# Patient Record
Sex: MALE | Race: OTHER | URBAN - METROPOLITAN AREA
[De-identification: names, ages, dates, MRNs, and addresses within clinical notes are randomized per-mention and may not be internally consistent; named-entity substitution may affect disease eponyms.]

---

## 2018-09-12 ENCOUNTER — EMERGENCY (EMERGENCY)
Facility: HOSPITAL | Age: 33
LOS: 1 days | Discharge: HOME | End: 2018-09-12
Attending: EMERGENCY MEDICINE
Payer: SELF-PAY

## 2018-09-12 VITALS
SYSTOLIC BLOOD PRESSURE: 134 MMHG | OXYGEN SATURATION: 99 % | DIASTOLIC BLOOD PRESSURE: 86 MMHG | TEMPERATURE: 98 F | HEART RATE: 70 BPM | RESPIRATION RATE: 20 BRPM

## 2018-09-12 VITALS — WEIGHT: 182.1 LBS | HEIGHT: 66 IN

## 2018-09-12 PROCEDURE — T1013: CPT

## 2018-09-12 PROCEDURE — 99283 EMERGENCY DEPT VISIT LOW MDM: CPT

## 2018-09-12 RX ORDER — CEPHALEXIN 500 MG
1 CAPSULE ORAL
Qty: 28 | Refills: 0 | OUTPATIENT
Start: 2018-09-12 | End: 2018-09-18

## 2018-09-12 RX ORDER — CEPHALEXIN 500 MG
500 CAPSULE ORAL ONCE
Qty: 0 | Refills: 0 | Status: COMPLETED | OUTPATIENT
Start: 2018-09-12 | End: 2018-09-12

## 2018-09-12 RX ADMIN — Medication 100 MILLIGRAM(S): at 21:18

## 2018-09-12 RX ADMIN — Medication 500 MILLIGRAM(S): at 21:19

## 2018-09-12 NOTE — ED STATDOCS - PHYSICAL EXAMINATION
Const: Awake, alert and oriented. In no acute distress. Well appearing.  HEENT: NC/AT. Moist mucous membranes.  Eyes: Conjunctiva pink, sclera white bilaterally, EOMI.  Neck:. Soft and supple. Full ROM without pain.  Cardiac. +S1/S2. No murmurs.   Resp: Speaking in full sentences. No evidence of respiratory distress. No wheezes, rales or rhonchi.  Abd: Soft, non-tender, non-distended. Normal bowel sounds in all 4 quadrants. No guarding or rebound.  Back: Spine midline and non-tender. No CVAT.  Skin: Area of slight erythema, no flutuence or induration noted to left lower groin area, no penile discharge, no external lesions   Lymph: No cervical lymphadenopathy.  Neuro: Awake, alert & oriented x 3. Moves all extremities symmetrically.

## 2018-09-12 NOTE — ED STATDOCS - ATTENDING CONTRIBUTION TO CARE
The patient seen and examined.  The patient has small area of open wound with minimal redness.  No tender, no crepitus, no fluctulant area.    The patient think it was insect bite.    Thigh cellulitis    I, Shaji Andrea, performed the initial face to face bedside interview with this patient regarding history of present illness, review of symptoms and relevant past medical, social and family history.  I completed an independent physical examination.  I was the initial provider who evaluated this patient. I have signed out the follow up of any pending tests (i.e. labs, radiological studies) to the ACP.  I have communicated the patient’s plan of care and disposition with the ACP.

## 2018-09-12 NOTE — ED ADULT TRIAGE NOTE - CHIEF COMPLAINT QUOTE
"There is something on my left groin" "I squeezed it and it looked like a bug came out but its a rash" c/o left groin pain x1 month. Denies fevers, reports pain top his back. Able to ambulate with steady gait noted. In no apparent distress @ this time

## 2018-09-12 NOTE — ED ADULT NURSE NOTE - OBJECTIVE STATEMENT
as per pt has a red area lt side of groin for over a month. was using a cream not helping denies pain

## 2018-09-12 NOTE — ED STATDOCS - OBJECTIVE STATEMENT
Patient is a 34 y/o male c/o of rash to his groin. Patient states he noticed a red spot one month ago. Patient states recently he squeezed the area and blood was released from area. Patient denies fevers. Patient denies any other complaints. Patient denies fevers, dysuria, hematuria, penile discharge, abdominal pain, nausea, vomiting.

## 2018-09-14 RX ORDER — CEPHALEXIN 500 MG
1 CAPSULE ORAL
Qty: 28 | Refills: 0 | OUTPATIENT
Start: 2018-09-14 | End: 2018-09-20

## 2022-11-12 NOTE — ED STATDOCS - DISCHARGE DATE
12-Sep-2018 [Alert] : alert [No Acute Distress] : no acute distress [Normocephalic] : normocephalic [EOMI Bilateral] : EOMI bilateral [Clear tympanic membranes with bony landmarks and light reflex present bilaterally] : clear tympanic membranes with bony landmarks and light reflex present bilaterally  [Nonerythematous Oropharynx] : nonerythematous oropharynx [Pink Nasal Mucosa] : pink nasal mucosa [Supple, full passive range of motion] : supple, full passive range of motion [No Palpable Masses] : no palpable masses [Clear to Auscultation Bilaterally] : clear to auscultation bilaterally [Regular Rate and Rhythm] : regular rate and rhythm [Normal S1, S2 audible] : normal S1, S2 audible [No Murmurs] : no murmurs [+2 Femoral Pulses] : +2 femoral pulses [NonTender] : non tender [Soft] : soft [Non Distended] : non distended [Normoactive Bowel Sounds] : normoactive bowel sounds [No Hepatomegaly] : no hepatomegaly [No Splenomegaly] : no splenomegaly [Benjamin: _____] : Benjamin [unfilled] [No Abnormal Lymph Nodes Palpated] : no abnormal lymph nodes palpated [Normal Muscle Tone] : normal muscle tone [No Gait Asymmetry] : no gait asymmetry [No pain or deformities with palpation of bone, muscles, joints] : no pain or deformities with palpation of bone, muscles, joints [Straight] : straight [+2 Patella DTR] : +2 patella DTR [Cranial Nerves Grossly Intact] : cranial nerves grossly intact [No Rash or Lesions] : no rash or lesions

## 2023-01-20 ENCOUNTER — OFFICE (OUTPATIENT)
Dept: URBAN - METROPOLITAN AREA CLINIC 115 | Facility: CLINIC | Age: 38
Setting detail: OPHTHALMOLOGY
End: 2023-01-20
Payer: COMMERCIAL

## 2023-01-20 DIAGNOSIS — H16.223: ICD-10-CM

## 2023-01-20 DIAGNOSIS — H01.005: ICD-10-CM

## 2023-01-20 DIAGNOSIS — H11.042: ICD-10-CM

## 2023-01-20 DIAGNOSIS — H01.002: ICD-10-CM

## 2023-01-20 PROCEDURE — 92004 COMPRE OPH EXAM NEW PT 1/>: CPT | Performed by: OPHTHALMOLOGY

## 2023-01-20 PROCEDURE — 92250 FUNDUS PHOTOGRAPHY W/I&R: CPT | Performed by: OPHTHALMOLOGY

## 2023-01-20 ASSESSMENT — SPHEQUIV_DERIVED
OS_SPHEQUIV: 0.5
OD_SPHEQUIV: 0

## 2023-01-20 ASSESSMENT — REFRACTION_AUTOREFRACTION
OD_CYLINDER: -0.50
OS_AXIS: 175
OD_AXIS: 001
OD_SPHERE: +0.25
OS_CYLINDER: -0.50
OS_SPHERE: +0.75

## 2023-01-20 ASSESSMENT — TONOMETRY
OS_IOP_MMHG: 15
OD_IOP_MMHG: 13

## 2023-01-20 ASSESSMENT — LID EXAM ASSESSMENTS
OS_BLEPHARITIS: LLL T
OD_BLEPHARITIS: RLL T

## 2023-01-20 ASSESSMENT — CORNEAL PTERYGIUM: OS_PTERYGIUM: NASAL 1MM

## 2023-01-20 ASSESSMENT — VISUAL ACUITY
OS_BCVA: 20/20
OD_BCVA: 20/20

## 2023-01-20 ASSESSMENT — CONFRONTATIONAL VISUAL FIELD TEST (CVF)
OD_FINDINGS: FULL
OS_FINDINGS: FULL

## 2023-01-20 ASSESSMENT — SUPERFICIAL PUNCTATE KERATITIS (SPK)
OD_SPK: T
OS_SPK: T

## 2023-03-30 ENCOUNTER — APPOINTMENT (OUTPATIENT)
Dept: NEUROLOGY | Facility: CLINIC | Age: 38
End: 2023-03-30

## 2025-05-08 NOTE — ED STATDOCS - NS_EDPROVIDERDISPOUSERTYPE_ED_A_ED
Overnight events noted      VITAL:  T(C): , Max: 37 (05-07-25 @ 20:05)  T(F): , Max: 98.6 (05-07-25 @ 20:05)  HR: 110 (05-08-25 @ 05:14)  BP: 114/75 (05-08-25 @ 05:14)  BP(mean): --  RR: 18 (05-08-25 @ 05:14)  SpO2: 98% (05-08-25 @ 05:14)  Wt(kg): --      PHYSICAL EXAM:  Constitutional: NAD, Alert  HEENT: NCAT, MMM  Neck: Supple, No JVD  Respiratory: CTA-b/l  Cardiovascular: tachy reg s1s2  Gastrointestinal: BS+, soft, NT/ND  : (+)maldonado  Extremities: No peripheral edema b/l  Neurological: reduced generalized strength  Back: no CVAT b/l  Skin: No rashes, no nevi    LABS:                        7.3    9.92  )-----------( 182      ( 08 May 2025 07:10 )             23.3     Na(143)/K(3.8)/Cl(107)/HCO3(18)/BUN(94)/Cr(4.84)Glu(102)/Ca(8.7)/Mg(1.9)/PO4(5.1)    05-08 @ 07:05  Na(140)/K(4.0)/Cl(104)/HCO3(21)/BUN(113)/Cr(5.06)Glu(114)/Ca(8.9)/Mg(1.9)/PO4(5.6)    05-07 @ 08:45  Na(142)/K(3.9)/Cl(106)/HCO3(18)/BUN(111)/Cr(4.91)Glu(102)/Ca(8.6)/Mg(1.9)/PO4(5.4)    05-06 @ 06:58        IMPRESSION: 64M w/ HTN, gout, AFib, and CKD4-5, 5/1/25 p/w DEEDEE on CKD  (1)CKD - stage 4-5 - diabetic nephropathy  (2)DEEDEE - obstructive - improving s/p maldonado placement. There was a superimposed prerenal component as of yesterday whereby the numbers stagnated; the numbers are again improving as of today, with effective treatment of the UTI  (3)Metabolic acidosis - renally mediated -acceptable, on standing PO NaHCO3  (4)Hyperphosphatemia -improving, on Renvela with meals  (5)CV - acceptable BP/volume  (6)Anemia - s/p PRBCs/Retacrit; on standing PO FeSO4  (7)ID - UTI - on IV Azactam      RECOMMEND:  (1)NaHCO3, FeSO4, and Renvela as ordered  (2)Continue abx for GFR<15  (3)BMP+Mg+PO4 daily while admitted            Hilton May MD  [facility]  Office/on call physician: [phone]  Cell (7a-7p): [phone]       no pain, no sob  sister Sudha at bedside      VITAL:  T(C): , Max: 37 (05-07-25 @ 20:05)  T(F): , Max: 98.6 (05-07-25 @ 20:05)  HR: 110 (05-08-25 @ 05:14)  BP: 114/75 (05-08-25 @ 05:14)  BP(mean): --  RR: 18 (05-08-25 @ 05:14)  SpO2: 98% (05-08-25 @ 05:14)  Wt(kg): --      PHYSICAL EXAM:  Constitutional: NAD, Alert  HEENT: NCAT, MMM  Neck: Supple, No JVD  Respiratory: CTA-b/l  Cardiovascular: tachy reg s1s2  Gastrointestinal: BS+, soft, NT/ND  : (+)maldonado  Extremities: No peripheral edema b/l  Neurological: reduced generalized strength  Back: no CVAT b/l  Skin: No rashes, no nevi    LABS:                        7.3    9.92  )-----------( 182      ( 08 May 2025 07:10 )             23.3     Na(143)/K(3.8)/Cl(107)/HCO3(18)/BUN(94)/Cr(4.84)Glu(102)/Ca(8.7)/Mg(1.9)/PO4(5.1)    05-08 @ 07:05  Na(140)/K(4.0)/Cl(104)/HCO3(21)/BUN(113)/Cr(5.06)Glu(114)/Ca(8.9)/Mg(1.9)/PO4(5.6)    05-07 @ 08:45  Na(142)/K(3.9)/Cl(106)/HCO3(18)/BUN(111)/Cr(4.91)Glu(102)/Ca(8.6)/Mg(1.9)/PO4(5.4)    05-06 @ 06:58        IMPRESSION: 64M w/ HTN, gout, AFib, and CKD4-5, 5/1/25 p/w DEEDEE on CKD  (1)CKD - stage 4-5 - diabetic nephropathy  (2)DEEDEE - obstructive - improving s/p maldonado placement. There was a superimposed prerenal component as of yesterday whereby the numbers stagnated; the numbers are again improving as of today, with effective treatment of the UTI  (3)Metabolic acidosis - renally mediated -acceptable, on standing PO NaHCO3  (4)Hyperphosphatemia -improving, on Renvela with meals  (5)CV - acceptable BP/volume  (6)Anemia - s/p PRBCs/Retacrit; on standing PO FeSO4  (7)ID - UTI - on IV Azactam      RECOMMEND:  (1)NaHCO3, FeSO4, and Renvela as ordered  (2)Continue abx for GFR<15  (3)BMP+Mg+PO4 daily while admitted    counseled patient and sister at bedside/answered all questions to their satisfaction        Hilton May MD  [facility]  Office/on call physician: [phone]  Cell (7a-7p): [phone]       (+)knee pain; no sob  sister Sudha at bedside      VITAL:  T(C): , Max: 37 (05-07-25 @ 20:05)  T(F): , Max: 98.6 (05-07-25 @ 20:05)  HR: 110 (05-08-25 @ 05:14)  BP: 114/75 (05-08-25 @ 05:14)  BP(mean): --  RR: 18 (05-08-25 @ 05:14)  SpO2: 98% (05-08-25 @ 05:14)  Wt(kg): --      PHYSICAL EXAM:  Constitutional: NAD, Alert  HEENT: NCAT, MMM  Neck: Supple, No JVD  Respiratory: CTA-b/l  Cardiovascular: tachy reg s1s2  Gastrointestinal: BS+, soft, NT/ND  : (+)maldonado  Extremities: No peripheral edema b/l  Neurological: reduced generalized strength  Back: no CVAT b/l  Skin: No rashes, no nevi    LABS:                        7.3    9.92  )-----------( 182      ( 08 May 2025 07:10 )             23.3     Na(143)/K(3.8)/Cl(107)/HCO3(18)/BUN(94)/Cr(4.84)Glu(102)/Ca(8.7)/Mg(1.9)/PO4(5.1)    05-08 @ 07:05  Na(140)/K(4.0)/Cl(104)/HCO3(21)/BUN(113)/Cr(5.06)Glu(114)/Ca(8.9)/Mg(1.9)/PO4(5.6)    05-07 @ 08:45  Na(142)/K(3.9)/Cl(106)/HCO3(18)/BUN(111)/Cr(4.91)Glu(102)/Ca(8.6)/Mg(1.9)/PO4(5.4)    05-06 @ 06:58        IMPRESSION: 64M w/ HTN, gout, AFib, and CKD4-5, 5/1/25 p/w DEEDEE on CKD  (1)CKD - stage 4-5 - diabetic nephropathy  (2)DEEDEE - obstructive - improving s/p maldonado placement. There was a superimposed prerenal component as of yesterday whereby the numbers stagnated; the numbers are again improving as of today, with effective treatment of the UTI  (3)Metabolic acidosis - renally mediated -acceptable, on standing PO NaHCO3  (4)Hyperphosphatemia -improving, on Renvela with meals  (5)CV - acceptable BP/volume  (6)Anemia - s/p PRBCs/Retacrit; on standing PO FeSO4  (7)ID - UTI - on IV Azactam      RECOMMEND:  (1)NaHCO3, FeSO4, and Renvela as ordered  (2)Continue abx for GFR<15  (3)BMP+Mg+PO4 daily while admitted    counseled patient and sister at bedside/answered all questions to their satisfaction        Hilton May MD  [facility]  Office/on call physician: [phone]  Cell (7a-7p): [phone]       Attending Attestation (For Attendings USE Only)...